# Patient Record
Sex: MALE | Race: WHITE
[De-identification: names, ages, dates, MRNs, and addresses within clinical notes are randomized per-mention and may not be internally consistent; named-entity substitution may affect disease eponyms.]

---

## 2017-10-12 ENCOUNTER — HOSPITAL ENCOUNTER (OUTPATIENT)
Dept: HOSPITAL 92 - SCSCT | Age: 57
Discharge: HOME | End: 2017-10-12
Attending: UROLOGY
Payer: COMMERCIAL

## 2017-10-12 DIAGNOSIS — K57.30: ICD-10-CM

## 2017-10-12 DIAGNOSIS — R31.29: Primary | ICD-10-CM

## 2017-10-12 DIAGNOSIS — N28.1: ICD-10-CM

## 2017-10-12 DIAGNOSIS — N40.0: ICD-10-CM

## 2017-10-12 LAB
ANION GAP SERPL CALC-SCNC: 15 MMOL/L (ref 10–20)
BUN SERPL-MCNC: 16 MG/DL (ref 8.4–25.7)
CALCIUM SERPL-MCNC: 9.4 MG/DL (ref 7.8–10.44)
CHLORIDE SERPL-SCNC: 102 MMOL/L (ref 98–107)
CO2 SERPL-SCNC: 29 MMOL/L (ref 22–29)
CREAT CL PREDICTED SERPL C-G-VRATE: 0 ML/MIN (ref 70–130)

## 2017-10-12 PROCEDURE — 80048 BASIC METABOLIC PNL TOTAL CA: CPT

## 2017-10-12 PROCEDURE — 74177 CT ABD & PELVIS W/CONTRAST: CPT

## 2017-10-12 NOTE — CT
CT ABDOMEN AND PELVIS WITH AND WITHOUT IV CONTRAST:

 

HISTORY: 

Microscopic hematuria.

 

FINDINGS: 

The lung bases are unremarkable.  The liver, spleen, pancreas, and adrenal glands are normal.  No ca
lcified gallstones are seen.  No free air, free fluid, or lymphadenopathy is identified in the abdom
en or pelvis.

 

No calculi are seen in the kidneys, ureters, or the urinary bladder.  There is a 6 cm exophytic cyst
 arising from the right kidney and a 2.5 cm cyst arising from the left kidney.  No enhancing renal m
ass is seen.  There is normal contrast excretion into the ureters and the urinary bladder.  No hydro
ureteral nephrosis is noted on either side.  A mildly enlarged prostate is present.

 

There is a normal-appearing appendix.  There is mild sigmoid diverticulosis.  There is no evidence o
f aneurysmal dilatation of the abdominal aorta.  There are degenerative changes in the spine.

 

IMPRESSION: 

1.  Bilateral renal cysts.

2.  No CT evidence of urinary tract calculi or obstruction.

3.  Mild sigmoid diverticulosis.

4.  Mild prostatic enlargement.

 

POS: [facility]